# Patient Record
Sex: FEMALE | Race: WHITE
[De-identification: names, ages, dates, MRNs, and addresses within clinical notes are randomized per-mention and may not be internally consistent; named-entity substitution may affect disease eponyms.]

---

## 2019-12-25 ENCOUNTER — HOSPITAL ENCOUNTER (EMERGENCY)
Dept: HOSPITAL 92 - ERS | Age: 49
Discharge: HOME | End: 2019-12-25
Payer: COMMERCIAL

## 2019-12-25 DIAGNOSIS — G43.909: ICD-10-CM

## 2019-12-25 DIAGNOSIS — M54.5: Primary | ICD-10-CM

## 2019-12-25 LAB
ALBUMIN SERPL BCG-MCNC: 3.8 G/DL (ref 3.5–5)
ALP SERPL-CCNC: 53 U/L (ref 40–110)
ALT SERPL W P-5'-P-CCNC: 13 U/L (ref 8–55)
ANION GAP SERPL CALC-SCNC: 12 MMOL/L (ref 10–20)
AST SERPL-CCNC: 12 U/L (ref 5–34)
BASOPHILS # BLD AUTO: 0.1 THOU/UL (ref 0–0.2)
BASOPHILS NFR BLD AUTO: 0.4 % (ref 0–1)
BILIRUB SERPL-MCNC: 0.2 MG/DL (ref 0.2–1.2)
BUN SERPL-MCNC: 21 MG/DL (ref 7–18.7)
CALCIUM SERPL-MCNC: 8.8 MG/DL (ref 7.8–10.44)
CHLORIDE SERPL-SCNC: 109 MMOL/L (ref 98–107)
CO2 SERPL-SCNC: 23 MMOL/L (ref 22–29)
CREAT CL PREDICTED SERPL C-G-VRATE: 0 ML/MIN (ref 70–130)
CRP SERPL-MCNC: (no result) MG/DL
EOSINOPHIL # BLD AUTO: 0.1 THOU/UL (ref 0–0.7)
EOSINOPHIL NFR BLD AUTO: 0.5 % (ref 0–10)
GLOBULIN SER CALC-MCNC: 2.5 G/DL (ref 2.4–3.5)
GLUCOSE SERPL-MCNC: 95 MG/DL (ref 70–105)
HGB BLD-MCNC: 12.8 G/DL (ref 12–16)
LYMPHOCYTES # BLD: 3.3 THOU/UL (ref 1.2–3.4)
LYMPHOCYTES NFR BLD AUTO: 26.6 % (ref 21–51)
MCH RBC QN AUTO: 30.7 PG (ref 27–31)
MCV RBC AUTO: 91.8 FL (ref 78–98)
MONOCYTES # BLD AUTO: 1 THOU/UL (ref 0.11–0.59)
MONOCYTES NFR BLD AUTO: 7.8 % (ref 0–10)
NEUTROPHILS # BLD AUTO: 8.1 THOU/UL (ref 1.4–6.5)
NEUTROPHILS NFR BLD AUTO: 64.7 % (ref 42–75)
PLATELET # BLD AUTO: 338 THOU/UL (ref 130–400)
POTASSIUM SERPL-SCNC: 3 MMOL/L (ref 3.5–5.1)
RBC # BLD AUTO: 4.15 MILL/UL (ref 4.2–5.4)
SODIUM SERPL-SCNC: 141 MMOL/L (ref 136–145)
WBC # BLD AUTO: 12.6 THOU/UL (ref 4.8–10.8)

## 2019-12-25 PROCEDURE — 72158 MRI LUMBAR SPINE W/O & W/DYE: CPT

## 2019-12-25 PROCEDURE — 36415 COLL VENOUS BLD VENIPUNCTURE: CPT

## 2019-12-25 PROCEDURE — 85025 COMPLETE CBC W/AUTO DIFF WBC: CPT

## 2019-12-25 PROCEDURE — 85652 RBC SED RATE AUTOMATED: CPT

## 2019-12-25 PROCEDURE — A9579 GAD-BASE MR CONTRAST NOS,1ML: HCPCS

## 2019-12-25 PROCEDURE — 80053 COMPREHEN METABOLIC PANEL: CPT

## 2019-12-25 PROCEDURE — 96372 THER/PROPH/DIAG INJ SC/IM: CPT

## 2019-12-25 PROCEDURE — 86140 C-REACTIVE PROTEIN: CPT

## 2019-12-25 NOTE — MRI
MRI Lumbar Spine with and without IVcontrast:



HISTORY:

Low back pain with pain radiating down left leg to the foot with associated weakness, numbness, and t
ingling. History of prior back surgery. Recent fall 4-5 days ago.



COMPARISON:

None



FINDINGS:

A tiny subcentimeter nonenhancing focus is seen within the anterior aspect midportion right kidney li
hardeep due to tiny cyst. The remaining visualized retroperitoneal structures demonstrate a normal MRI

appearance.



Conus medullaris is normal in morphology and terminates at the L1 level.

Mild endplate degenerative changes are seen at the L5-S1 level. Small subcentimeter focus of increase
d T1 and T2-weighted signal intensity is seen in the L4 vertebral body likely due to small

hemangioma versus focal area of fat. Similar finding is seen in the posterior aspect L3 vertebral bod
y.



L1-2: There is a minimal disc osteophyte complex, but the central spinal canal and neural foramina ar
e patent.



L2-3: Minimal disc osteophyte complex is present with facet degenerative changes. Central spinal checo
l and neural foramina are patent



L3-4: Facet hypertrophic changes are noted. There is a mild disc osteophyte complex. Findings result 
in slight effacement of ventral aspect of the thecal sac. Neural foramina are patent.



L4-5: There is a broad-based disc osteophyte complex with an annular tear involving the left posterol
ateral margin of the intervertebral disc. Facet hypertrophic changes are present. The right neural

foramen is patent, but there is mild left-sided neural foraminal narrowing. No significant central ca
nal narrowing is present.



L5-S1: Left laminotomy defect is seen at this level. There is a left paracentral disc extrusion measu
ring 11 mm in AP dimensions. This results in mass effect on the left anterolateral aspect of the

thecal sac and contacts and displaces the traversing left S1 nerve root. The right neural foramen is 
patent, but there is mild left-sided neural foraminal narrowing. Mild facet degenerative changes

are present at this level.



No abnormal areas of enhancement are seen after the administration of intravenous contrast.



IMPRESSION:

Left paracentral disc extrusion at the L5-S1 level resulting in mass effect on the left anterolateral
 aspect of the thecal sac with posterior displacement of the traversing left S1 nerve root.

Although prior study is not available for comparison, findings are likely related to recurrent disc h
erniation at this level. This is at site of prior left laminotomy defect.



Reported By: Mukesh Vickers 

Electronically Signed:  12/25/2019 10:49 PM